# Patient Record
Sex: FEMALE | Race: BLACK OR AFRICAN AMERICAN | NOT HISPANIC OR LATINO
[De-identification: names, ages, dates, MRNs, and addresses within clinical notes are randomized per-mention and may not be internally consistent; named-entity substitution may affect disease eponyms.]

---
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
## 2025-01-02 NOTE — DISCHARGE NOTE OB - FUNCTIONAL SCREEN CURRENT LEVEL: AMBULATION, MLM
Pt reports + HPT.   H/o recurrent miscarriages.   Last delivery 7/2023  Used progesterone in the past with her pregnancies- progesterone 200mg PV qhs sent to pharmacy.   HCG/Progesterone levels ordered, pt to complete.   All questions answered.   
0 = independent